# Patient Record
Sex: FEMALE | Race: OTHER | URBAN - METROPOLITAN AREA
[De-identification: names, ages, dates, MRNs, and addresses within clinical notes are randomized per-mention and may not be internally consistent; named-entity substitution may affect disease eponyms.]

---

## 2018-05-24 ENCOUNTER — EMERGENCY (EMERGENCY)
Facility: HOSPITAL | Age: 38
LOS: 0 days | Discharge: HOME | End: 2018-05-24
Attending: EMERGENCY MEDICINE | Admitting: EMERGENCY MEDICINE

## 2018-05-24 VITALS
HEART RATE: 88 BPM | RESPIRATION RATE: 19 BRPM | WEIGHT: 203.05 LBS | DIASTOLIC BLOOD PRESSURE: 72 MMHG | HEIGHT: 64 IN | OXYGEN SATURATION: 98 % | SYSTOLIC BLOOD PRESSURE: 153 MMHG | TEMPERATURE: 97 F

## 2018-05-24 DIAGNOSIS — M54.9 DORSALGIA, UNSPECIFIED: ICD-10-CM

## 2018-05-24 DIAGNOSIS — G89.29 OTHER CHRONIC PAIN: ICD-10-CM

## 2018-05-24 DIAGNOSIS — M54.42 LUMBAGO WITH SCIATICA, LEFT SIDE: ICD-10-CM

## 2018-05-24 RX ORDER — KETOROLAC TROMETHAMINE 30 MG/ML
60 SYRINGE (ML) INJECTION ONCE
Qty: 0 | Refills: 0 | Status: DISCONTINUED | OUTPATIENT
Start: 2018-05-24 | End: 2018-05-24

## 2018-05-24 RX ORDER — METHOCARBAMOL 500 MG/1
750 TABLET, FILM COATED ORAL ONCE
Qty: 0 | Refills: 0 | Status: COMPLETED | OUTPATIENT
Start: 2018-05-24 | End: 2018-05-24

## 2018-05-24 RX ADMIN — METHOCARBAMOL 750 MILLIGRAM(S): 500 TABLET, FILM COATED ORAL at 23:09

## 2018-05-24 RX ADMIN — Medication 30 MILLIGRAM(S): at 23:12

## 2018-05-24 NOTE — ED PROVIDER NOTE - ATTENDING CONTRIBUTION TO CARE
36 yo F, chronic sciatica pain, here for assessment of L sided back pain radiating to LE. Pain is typical of previous, just didn't resolve with motrin which she took this morning. No neurological findings, drop foot, urinary or fecal incontinence or retention. Has unremarkable exam aside from mid TTP at pelvic notch and positive straight leg raise.     Sx suggestive of sciatica without new neuro findings.     Motrin, toradol, steroids. Advised on need for MRI, neuro/ortho follow up for continued care.

## 2018-05-24 NOTE — ED PROVIDER NOTE - OBJECTIVE STATEMENT
36 yo F ho sciatic back pain presents with L sided back pain radiating down bottom to LE. States feels exactly like baseline sciatica but pain not better with motrin from this am. Recently started seeing PT and acupuncture, unsure if that exacerbated pain. Denies inability to walk, losss of sensation to LE, numbness, IV drug use, loss of bowel and bladder fx, cp, sob, nausea, vomiting.

## 2018-05-24 NOTE — ED PROVIDER NOTE - PHYSICAL EXAMINATION
CONSTITUTIONAL: Well-developed; well-nourished; in no acute distress.   SKIN: warm, dry  NECK: Supple; non tender.  CARD: S1, S2 normal; no murmurs, gallops, or rubs. Regular rate and rhythm.   RESP: No wheezes, rales or rhonchi.  ABD: soft ntnd  EXT: Normal ROM.  No saddle anesthesia. no foot drop, 5/5 motor.   NEURO: Alert, oriented, grossly unremarkable  PSYCH: Cooperative, appropriate. CONSTITUTIONAL: Well-developed; well-nourished; in no acute distress.   SKIN: warm, dry  NECK: Supple; non tender.  CARD: S1, S2 normal; no murmurs, gallops, or rubs. Regular rate and rhythm.   RESP: No wheezes, rales or rhonchi.  ABD: soft ntnd  BACK: No midline ttp, + Ll back ttp. +straight leg test that reproduces exact pain.   EXT: Normal ROM.  No saddle anesthesia. no foot drop, 5/5 motor.   NEURO: Alert, oriented, grossly unremarkable  PSYCH: Cooperative, appropriate.

## 2018-05-25 ENCOUNTER — EMERGENCY (EMERGENCY)
Facility: HOSPITAL | Age: 38
LOS: 1 days | Discharge: HOME | End: 2018-05-25
Attending: EMERGENCY MEDICINE | Admitting: EMERGENCY MEDICINE

## 2018-05-25 VITALS
RESPIRATION RATE: 18 BRPM | OXYGEN SATURATION: 100 % | TEMPERATURE: 98 F | DIASTOLIC BLOOD PRESSURE: 100 MMHG | SYSTOLIC BLOOD PRESSURE: 170 MMHG | HEART RATE: 106 BPM

## 2018-05-25 DIAGNOSIS — M54.5 LOW BACK PAIN: ICD-10-CM

## 2018-05-25 DIAGNOSIS — M54.9 DORSALGIA, UNSPECIFIED: ICD-10-CM

## 2018-05-25 DIAGNOSIS — G89.29 OTHER CHRONIC PAIN: ICD-10-CM

## 2018-05-26 VITALS
DIASTOLIC BLOOD PRESSURE: 88 MMHG | OXYGEN SATURATION: 98 % | RESPIRATION RATE: 18 BRPM | SYSTOLIC BLOOD PRESSURE: 142 MMHG | HEART RATE: 85 BPM

## 2018-05-26 RX ORDER — KETOROLAC TROMETHAMINE 30 MG/ML
60 SYRINGE (ML) INJECTION ONCE
Qty: 0 | Refills: 0 | Status: DISCONTINUED | OUTPATIENT
Start: 2018-05-26 | End: 2018-05-26

## 2018-05-26 RX ORDER — METHOCARBAMOL 500 MG/1
1000 TABLET, FILM COATED ORAL ONCE
Qty: 0 | Refills: 0 | Status: COMPLETED | OUTPATIENT
Start: 2018-05-26 | End: 2018-05-26

## 2018-05-26 RX ORDER — KETOROLAC TROMETHAMINE 30 MG/ML
30 SYRINGE (ML) INJECTION ONCE
Qty: 0 | Refills: 0 | Status: DISCONTINUED | OUTPATIENT
Start: 2018-05-26 | End: 2018-05-26

## 2018-05-26 RX ORDER — MORPHINE SULFATE 50 MG/1
4 CAPSULE, EXTENDED RELEASE ORAL ONCE
Qty: 0 | Refills: 0 | Status: DISCONTINUED | OUTPATIENT
Start: 2018-05-26 | End: 2018-05-26

## 2018-05-26 RX ORDER — DEXAMETHASONE 0.5 MG/5ML
10 ELIXIR ORAL ONCE
Qty: 0 | Refills: 0 | Status: COMPLETED | OUTPATIENT
Start: 2018-05-26 | End: 2018-05-26

## 2018-05-26 RX ORDER — METHOCARBAMOL 500 MG/1
500 TABLET, FILM COATED ORAL ONCE
Qty: 0 | Refills: 0 | Status: DISCONTINUED | OUTPATIENT
Start: 2018-05-26 | End: 2018-05-26

## 2018-05-26 RX ORDER — METHOCARBAMOL 500 MG/1
2 TABLET, FILM COATED ORAL
Qty: 18 | Refills: 0 | OUTPATIENT
Start: 2018-05-26 | End: 2018-05-28

## 2018-05-26 RX ADMIN — Medication 10 MILLIGRAM(S): at 03:50

## 2018-05-26 RX ADMIN — METHOCARBAMOL 1000 MILLIGRAM(S): 500 TABLET, FILM COATED ORAL at 03:46

## 2018-05-26 RX ADMIN — MORPHINE SULFATE 4 MILLIGRAM(S): 50 CAPSULE, EXTENDED RELEASE ORAL at 04:24

## 2018-05-26 RX ADMIN — Medication 60 MILLIGRAM(S): at 03:47

## 2018-05-26 NOTE — ED PROVIDER NOTE - ATTENDING CONTRIBUTION TO CARE
36 yo f with pmh of herniated discs with sciatica, presents with left lower back pain/buttock pain with radiation down her left leg.  pt denies saddle anesthesia, urinary retention/incontinence, midline back pain, trauma, leg weakness.  pt was in ed yesterday, given meds, but no rx.  pt says pain has continued. 36 yo f with pmh of herniated discs with sciatica, presents with left lower back pain/buttock pain with radiation down her left leg.  pt denies saddle anesthesia, urinary retention/incontinence, midline back pain, trauma, leg weakness.  pt was in ed yesterday, given meds, but no rx.  pt says pain has continued. exam: no midline back ttp, tense and mildly ttp left gluteus, sensation intact b/l le, walking in ed imp: pt with h/o sciatica, c/o left lower back pain.  no red  flags, ambulatory, will rpt meds, add decadron, advised to f/u with her neuro/pain mgt

## 2018-05-26 NOTE — ED PROVIDER NOTE - PHYSICAL EXAMINATION
VITAL SIGNS: I have reviewed the initial vital signs.   CONSTITUTIONAL: Awake, alert. Well-developed; well-nourished; in no distress. Non-toxic appearing.   SKIN: No rash, vesicles/lesion, abrasions or lacerations. No ecchymosis or signs of trauma. Cap refill < 2 secs.   HEAD: Normocephalic; atraumatic. No step offs or tenderness. No Wright Sign’s or Raccoon eyes.   EYES: Symmetrical, no discharge or signs of trauma. Conjunctiva and sclera clear. PERRL, EOM intact with no nystagmus or pain.   ENT: Airway patent. MMM.   NECK: Supple; non-tender.   CARD: No chest wall deformity or tenderness. S1, S2 normal; no murmurs, gallops, or rubs. Regular rate and rhythm.  RESP: Good air movement. Lungs CTAB. No crackles, wheezes, rales or rhonchi.  ABD: Soft; non-distended; non-tender.    EXT: No bony deformity or tenderness. Normal ROM x 4 extremities. No midline spinal ttp. + b/l paravertebral tenderness with + muscle spasm.   NEURO: A&Ox3. GCS 15. Normal speech. CN 2-12 intact. Strength 5/5 UE/LE b/l. No sensory deficits. n/v intact UE/LE b/l, pulses symmetrical. Normal gait.  PSYCH: Cooperative, appropriate.

## 2018-05-26 NOTE — ED PROVIDER NOTE - NS ED ROS FT
Except as documented in HPI, all other ROS negative.   GENERAL: Denies fever/chills, loss of appetite/weight or fatigue.  SKIN: Denies rashes, abrasions, lacerations, ecchymosis, erythema, or edema.  HEAD: Denies headache, dizziness or trauma.  ENT: Denies earaches, discharge or hearing loss. Denies nasal discharge or epistaxis. Denies sore throat.   CARDIAC: Denies chest pain, palpitations, or SOB.   RESPIRATORY: Denies SOB, cough, hemoptysis or wheezing.   GI: Denies abdominal pain, n/v/d.   : Denies hematuria, dysuria or frequency.   MSK: + back pain.   NEURO: Denies paresthesias, tingling or weakness.

## 2018-05-26 NOTE — ED PROVIDER NOTE - OBJECTIVE STATEMENT
Pt is a 36 y/o Female, PMHX of herniated discs, Sciatica, presents to ED for back pain. Pt reports she has been taking Motrin with no relief. Was seen in ED last night, given Toradol and Robaxin, reports minimal relief. States pain is similar to her chronic back pain in the past, is worse with ambulation. Denies fever/chills, trauma/injury, urinary/fecal incontinence, prior back sxs or injections, abdominal pain, n/v/d, urinary symptoms, numbness, tingling, weakness.

## 2019-10-11 ENCOUNTER — TRANSCRIPTION ENCOUNTER (OUTPATIENT)
Age: 39
End: 2019-10-11

## 2024-01-04 ENCOUNTER — EMERGENCY (EMERGENCY)
Facility: HOSPITAL | Age: 44
LOS: 0 days | Discharge: HOME | End: 2024-01-04
Attending: EMERGENCY MEDICINE
Payer: MEDICAID

## 2024-01-04 VITALS
RESPIRATION RATE: 18 BRPM | SYSTOLIC BLOOD PRESSURE: 144 MMHG | HEART RATE: 82 BPM | TEMPERATURE: 98 F | WEIGHT: 184.97 LBS | OXYGEN SATURATION: 99 % | DIASTOLIC BLOOD PRESSURE: 86 MMHG

## 2024-01-04 VITALS — HEART RATE: 78 BPM | DIASTOLIC BLOOD PRESSURE: 74 MMHG | SYSTOLIC BLOOD PRESSURE: 143 MMHG | TEMPERATURE: 98 F

## 2024-01-04 DIAGNOSIS — R00.2 PALPITATIONS: ICD-10-CM

## 2024-01-04 DIAGNOSIS — R07.9 CHEST PAIN, UNSPECIFIED: ICD-10-CM

## 2024-01-04 DIAGNOSIS — I10 ESSENTIAL (PRIMARY) HYPERTENSION: ICD-10-CM

## 2024-01-04 PROBLEM — M54.30 SCIATICA, UNSPECIFIED SIDE: Chronic | Status: ACTIVE | Noted: 2018-05-24

## 2024-01-04 PROBLEM — M54.9 DORSALGIA, UNSPECIFIED: Chronic | Status: ACTIVE | Noted: 2018-05-24

## 2024-01-04 PROCEDURE — 99284 EMERGENCY DEPT VISIT MOD MDM: CPT | Mod: 25

## 2024-01-04 PROCEDURE — 93005 ELECTROCARDIOGRAM TRACING: CPT

## 2024-01-04 PROCEDURE — 71045 X-RAY EXAM CHEST 1 VIEW: CPT

## 2024-01-04 PROCEDURE — 93308 TTE F-UP OR LMTD: CPT

## 2024-01-04 PROCEDURE — 93010 ELECTROCARDIOGRAM REPORT: CPT

## 2024-01-04 PROCEDURE — 99053 MED SERV 10PM-8AM 24 HR FAC: CPT

## 2024-01-04 PROCEDURE — 93308 TTE F-UP OR LMTD: CPT | Mod: 26

## 2024-01-04 PROCEDURE — 71045 X-RAY EXAM CHEST 1 VIEW: CPT | Mod: 26

## 2024-01-04 PROCEDURE — 99285 EMERGENCY DEPT VISIT HI MDM: CPT

## 2024-01-04 NOTE — ED PROVIDER NOTE - CLINICAL SUMMARY MEDICAL DECISION MAKING FREE TEXT BOX
Patient with palpitations no chest pain.  Here EKG no ischemia no arrhythmia chest x-ray within normal limits and bedside echo within normal limits.  Patient advised outpatient follow-up workup of palpitations.  Interpretation by Dr. Denys Brown: normal  EKG: NSR, nml axis, normal intervals, no ST Elevations independent interpretation of the Xray film(s) performed by: Dr. Denys Brown: CARLOS

## 2024-01-04 NOTE — ED PROVIDER NOTE - PATIENT PORTAL LINK FT
You can access the FollowMyHealth Patient Portal offered by [facility] by registering at the following website: [email or web address]. By joining [employer]’s FollowMyHealth portal, you will also be able to view your health information using other applications (apps) compatible with our system. You can access the FollowMyHealth Patient Portal offered by  by registering at the following website: [email or web address]. By joining [employer]’s FollowMyHealth portal, you will also be able to view your health information using other applications (apps) compatible with our system.

## 2024-01-04 NOTE — ED PROVIDER NOTE - OBJECTIVE STATEMENT
Patient is a 43-year-old female with past medical history of hypertension (not currently taking her medications) presenting for palpitations.  Patient endorses 1 week of persistent, non-exertional chest palpitations and wanted to be evaluated.  Patient denies any prior history of heart disease, family history of cardiac sudden death, not on oral contraceptives, no history of DVT or pulmonary embolisms, denies recent travels, no history of cancer or hemoptysis, denies shortness of breath, recent travels, nausea, vomiting, fevers, congestion, abdominal pain, or additional complaints.

## 2024-01-04 NOTE — ED PROVIDER NOTE - NSFOLLOWUPINSTRUCTIONS_ED_ALL_ED_FT
Our Emergency Department Referral Coordinators will be reaching out to you in the next 24-48 hours from 9:00am to 5:00pm with a follow up appointment. Please expect a phone call from the hospital in that time frame. If you do not receive a call or if you have any questions or concerns, you can reach them at   [phone]     Chest Pain    Chest pain can be caused by many different conditions which may or may not be dangerous. Causes include heartburn, lung infections, heart attack, blood clot in lungs, skin infections, strain or damage to muscle, cartilage, or bones, etc. Lab tests or other studies including an electrocardiogram (EKG) may have been performed to find the cause of your pain. Make sure to follow up with a cardiologist or as instructed by your health care professional.    SEEK IMMEDIATE MEDICAL CARE IF YOU HAVE THE FOLLOWING SYMPTOMS: worsening chest pain, coughing up blood, unexplained back/neck/jaw pain, severe abdominal pain, dizziness or lightheadedness, shortness of breath, sweaty or clammy skin, vomiting, or racing heart beat. These symptoms may represent a serious problem that is an emergency. Do not wait to see if the symptoms will go away. Get medical help right away. Call your local emergency services (911 in the [place] Do not drive yourself to the hospital. Our Emergency Department Referral Coordinators will be reaching out to you in the next 24-48 hours from 9:00am to 5:00pm with a follow up appointment. Please expect a phone call from the hospital in that time frame. If you do not receive a call or if you have any questions or concerns, you can reach them at   [phone]     Chest Pain    Chest pain can be caused by many different conditions which may or may not be dangerous. Causes include heartburn, lung infections, heart attack, blood clot in lungs, skin infections, strain or damage to muscle, cartilage, or bones, etc. Lab tests or other studies including an electrocardiogram (EKG) may have been performed to find the cause of your pain. Make sure to follow up with a cardiologist or as instructed by your health care professional.    SEEK IMMEDIATE MEDICAL CARE IF YOU HAVE THE FOLLOWING SYMPTOMS: worsening chest pain, coughing up blood, unexplained back/neck/jaw pain, severe abdominal pain, dizziness or lightheadedness, shortness of breath, sweaty or clammy skin, vomiting, or racing heart beat. These symptoms may represent a serious problem that is an emergency. Do not wait to see if the symptoms will go away. Get medical help right away. Call your local emergency services (911 in the [place] Do not drive yourself to the hospital.

## 2024-01-04 NOTE — ED ADULT NURSE NOTE - NSFALLUNIVINTERV_ED_ALL_ED
Bed/Stretcher in lowest position, wheels locked, appropriate side rails in place/Call bell, personal items and telephone in reach/Instruct patient to call for assistance before getting out of bed/chair/stretcher/Non-slip footwear applied when patient is off stretcher/[place] to call system/Physically safe environment - no spills, clutter or unnecessary equipment/Purposeful proactive rounding/Room/bathroom lighting operational, light cord in reach Bed/Stretcher in lowest position, wheels locked, appropriate side rails in place/Call bell, personal items and telephone in reach/Instruct patient to call for assistance before getting out of bed/chair/stretcher/Non-slip footwear applied when patient is off stretcher/[place] to call system/Physically safe environment - no spills, clutter or unnecessary equipment/Purposeful proactive rounding/Room/bathroom lighting operational, light cord in reach

## 2024-01-04 NOTE — ED ADULT NURSE NOTE - OBJECTIVE STATEMENT
OVERNIGHT EVENTS: None    SUBJECTIVE:  Patient seen and examined at bedside.  ROS: Patient denies h/n/v/d, fever, chills, cp, palpitations, sob, abd pain, leg swelling, rashes, dysuria, and changes in BM.     Vital Signs Last 12 Hrs  T(F): 98.8 (12-02-22 @ 05:59), Max: 98.8 (12-02-22 @ 05:59)  HR: 83 (12-02-22 @ 05:59) (83 - 83)  BP: 98/63 (12-02-22 @ 05:59) (98/63 - 98/63)  BP(mean): --  RR: 18 (12-02-22 @ 05:59) (18 - 18)  SpO2: 92% (12-02-22 @ 05:59) (92% - 92%)  I&O's Summary    01 Dec 2022 07:01  -  02 Dec 2022 07:00  --------------------------------------------------------  IN: 50 mL / OUT: 190 mL / NET: -140 mL        PHYSICAL EXAM:  Constitutional: resting comfortably in bed on 3L NC; NAD  HEENT: NC/AT, PERRL, EOMI, anicteric sclera, no nasal discharge; MMM  Neck: supple; no JVD  Respiratory: unlabored breathing, decreased breath sounds to left lung; no wheezing, rhonchi, or crackles, no intercostal retractions, no accessory muscle use, no nasal flaring  Cardiac: +S1/S2; RRR; no M/R/G  Gastrointestinal: soft, NT/ND; no rebound or guarding; +BS  Extremities: no clubbing or cyanosis; no peripheral edema  Vascular: 2+ radial pulses B/L  Dermatologic: skin warm, dry and intact  Neurologic: AAOx3; no focal deficits      LABS:               11.7   14.44 )-----------( 742      ( 02 Dec 2022 08:04 )             37.6     12-02    133<L>  |  97  |  5<L>  ----------------------------<  111<H>  4.8   |  27  |  0.65    Ca    8.5      02 Dec 2022 08:04  Phos  2.4     12-02  Mg     2.1     12-02    TPro  6.6  /  Alb  2.8<L>  /  TBili  0.2  /  DBili  x   /  AST  26  /  ALT  47<H>  /  AlkPhos  109  12-02    PT/INR - ( 01 Dec 2022 08:04 )   PT: 15.7 sec;   INR: 1.32          PTT - ( 01 Dec 2022 08:04 )  PTT:28.7 sec        RADIOLOGY & ADDITIONAL TESTS:    MEDICATIONS  (STANDING):  alteplase  Injectable for Pleural Effusion 10 milliGRAM(s) IntraPleural. every 12 hours  budesonide 160 MICROgram(s)/formoterol 4.5 MICROgram(s) Inhaler 2 Puff(s) Inhalation two times a day  dornase dedra Solution for Pleural Effusion 5 milliGRAM(s) IntraPleural. every 12 hours  enoxaparin Injectable 40 milliGRAM(s) SubCutaneous every 24 hours  piperacillin/tazobactam IVPB.. 3.375 Gram(s) IV Intermittent every 8 hours  sodium chloride 0.9% Solution for Pleural Effusion 30 milliLiter(s) IntraPleural. every 12 hours    MEDICATIONS  (PRN):  acetaminophen     Tablet .. 650 milliGRAM(s) Oral every 6 hours PRN Temp greater or equal to 38C (100.4F), Mild Pain (1 - 3)  guaiFENesin Oral Liquid (Sugar-Free) 100 milliGRAM(s) Oral every 8 hours PRN Cough  melatonin 3 milliGRAM(s) Oral at bedtime PRN Insomnia   Pt complains of "irregular heart beat" for past couple of days.

## 2024-02-08 ENCOUNTER — NON-APPOINTMENT (OUTPATIENT)
Age: 44
End: 2024-02-08

## 2024-05-30 PROBLEM — Z00.00 ENCOUNTER FOR PREVENTIVE HEALTH EXAMINATION: Status: ACTIVE | Noted: 2024-05-30

## 2024-06-08 ENCOUNTER — OUTPATIENT (OUTPATIENT)
Dept: OUTPATIENT SERVICES | Facility: HOSPITAL | Age: 44
LOS: 1 days | End: 2024-06-08
Payer: MEDICAID

## 2024-06-08 DIAGNOSIS — Z12.31 ENCOUNTER FOR SCREENING MAMMOGRAM FOR MALIGNANT NEOPLASM OF BREAST: ICD-10-CM

## 2024-06-08 DIAGNOSIS — Z00.00 ENCOUNTER FOR GENERAL ADULT MEDICAL EXAMINATION WITHOUT ABNORMAL FINDINGS: ICD-10-CM

## 2024-06-08 PROCEDURE — 77067 SCR MAMMO BI INCL CAD: CPT

## 2024-06-08 PROCEDURE — 77063 BREAST TOMOSYNTHESIS BI: CPT | Mod: 26

## 2024-06-08 PROCEDURE — 77067 SCR MAMMO BI INCL CAD: CPT | Mod: 26

## 2024-06-08 PROCEDURE — 77063 BREAST TOMOSYNTHESIS BI: CPT

## 2024-06-09 DIAGNOSIS — Z12.31 ENCOUNTER FOR SCREENING MAMMOGRAM FOR MALIGNANT NEOPLASM OF BREAST: ICD-10-CM

## 2024-06-14 ENCOUNTER — APPOINTMENT (OUTPATIENT)
Dept: PAIN MANAGEMENT | Facility: CLINIC | Age: 44
End: 2024-06-14
Payer: MEDICAID

## 2024-06-14 DIAGNOSIS — M54.50 LOW BACK PAIN, UNSPECIFIED: ICD-10-CM

## 2024-06-14 DIAGNOSIS — M54.16 RADICULOPATHY, LUMBAR REGION: ICD-10-CM

## 2024-06-14 DIAGNOSIS — Z86.79 PERSONAL HISTORY OF OTHER DISEASES OF THE CIRCULATORY SYSTEM: ICD-10-CM

## 2024-06-14 PROCEDURE — 99204 OFFICE O/P NEW MOD 45 MIN: CPT

## 2024-06-14 RX ORDER — METHYLPREDNISOLONE 4 MG/1
4 TABLET ORAL
Qty: 1 | Refills: 0 | Status: ACTIVE | COMMUNITY
Start: 2024-06-14 | End: 1900-01-01

## 2024-06-14 RX ORDER — AMLODIPINE BESYLATE 5 MG/1
5 TABLET ORAL
Refills: 0 | Status: ACTIVE | COMMUNITY

## 2024-06-14 NOTE — DISCUSSION/SUMMARY
[de-identified] : A discussion regarding available pain management treatment options occurred with the patient. These included interventional, rehabilitative, pharmacological, and alternative modalities. We will proceed with the following:   Rehabilitative options: -Participation in active HEP was discussed and printed. LUMBAR DISC: Patient given specific exercises to do including side plank, Quadruped arm/leg raise, Extension exercise, and Glut Bridges.   Medication based treatment options: - ordered a Medrol Dose Pack 4mg, use as directed for a duration of 6 days.   Complementary treatment options: - Patient was advised to stay away from any heavy lifting. If needed, she was advised to squat and not bend forward. - Initiate physician directed activity and lifestyle modifications.  Follow up in 4-6 weeks for reassessment.  I, Leatha Ohara, attest that this documentation has been prepared under the direction and in the presence of Provider Joe Lane, DO The documentation recorded by the scribe, in my presence, accurately reflects the service I personally performed, and the decisions made by me with my edits as appropriate.   Best Regards, Joe Lane D.O.

## 2024-06-14 NOTE — HISTORY OF PRESENT ILLNESS
[FreeTextEntry1] : HISTORY OF PRESENT ILLNESS: Mrs. Costello is a 43-year-old female complaining of lower back pain. The pain started after no specific injury or event. The patient has had this pain for years.  Patient describes the pain as moderate to severe.  During the last month the pain has been nearly constant with symptoms worsening in no typical pattern. Pain described as sharp, burning, tingling that does radiates down the left leg that is 8/10 pain intensity. Sitting and bending worsens the pain, standing improves the pain. NSAIDs and HEP has been tried without relief. Patient denies bowel/bladder incontinence, weakness, falls.   ACTIVITIES: Patient uses no assisted walking device at this time.  Patient has difficulty performing household chores, going to work, doing yardwork or shopping, participating in recreational activities & exercise at this time.   PRIOR PAIN TREATMENTS:  Moderate relief with nerve block injection, physical therapy, exercise, TENS, heat treatment, cold treatment.  Prior Pain Medications: Tylenol, Aleve.

## 2024-06-21 ENCOUNTER — OUTPATIENT (OUTPATIENT)
Dept: OUTPATIENT SERVICES | Facility: HOSPITAL | Age: 44
LOS: 1 days | End: 2024-06-21
Payer: MEDICAID

## 2024-06-21 DIAGNOSIS — R92.8 OTHER ABNORMAL AND INCONCLUSIVE FINDINGS ON DIAGNOSTIC IMAGING OF BREAST: ICD-10-CM

## 2024-06-21 PROCEDURE — 77061 BREAST TOMOSYNTHESIS UNI: CPT | Mod: 26

## 2024-06-21 PROCEDURE — G0279: CPT

## 2024-06-21 PROCEDURE — 77065 DX MAMMO INCL CAD UNI: CPT | Mod: RT

## 2024-06-21 PROCEDURE — 76641 ULTRASOUND BREAST COMPLETE: CPT | Mod: 50

## 2024-06-21 PROCEDURE — 77065 DX MAMMO INCL CAD UNI: CPT | Mod: 26,RT

## 2024-06-21 PROCEDURE — 76641 ULTRASOUND BREAST COMPLETE: CPT | Mod: 26,50

## 2024-06-22 DIAGNOSIS — R92.8 OTHER ABNORMAL AND INCONCLUSIVE FINDINGS ON DIAGNOSTIC IMAGING OF BREAST: ICD-10-CM

## 2024-07-26 ENCOUNTER — APPOINTMENT (OUTPATIENT)
Dept: PAIN MANAGEMENT | Facility: CLINIC | Age: 44
End: 2024-07-26
Payer: MEDICAID

## 2024-07-26 DIAGNOSIS — M54.16 RADICULOPATHY, LUMBAR REGION: ICD-10-CM

## 2024-07-26 DIAGNOSIS — M54.2 CERVICALGIA: ICD-10-CM

## 2024-07-26 PROCEDURE — 99213 OFFICE O/P EST LOW 20 MIN: CPT

## 2024-07-26 NOTE — DISCUSSION/SUMMARY
[de-identified] : A discussion regarding available pain management treatment options occurred with the patient. These included interventional, rehabilitative, pharmacological, and alternative modalities. We will proceed with the following:   Imaginf:  1. MRI lumbar spine w/o contrast was ordered to evaluate for anatomic changes of the lumbar discs, nerves, and surrounding tissue that will help provide information to accurately diagnose the patient's cause of pain and therefore treat said pain generator in the most effective way possible - whether that be specific physical therapy recommendations, medications, and/or interventional therapies.  Rehabilitative options: -Participation in active HEP was discussed and printed. *I gave the patient a list of home exercises to do for pain reduction and overall improvement in functional status including but not limited to active neck rotation, active neck side bend, neck flexion, neck extension, chin tuck, scalene stretch, isometric neck flexion, isometric neck extension, isometric neck side bend, head lift/neck curl, head lift/neck side bend, neck extension on hands and knees, and scapula squeeze. LUMBAR DISC: Patient given specific exercises to do including side plank, Quadruped arm/leg raise, Extension exercise, and Glut Bridges.    Complementary treatment options: - Patient was advised to stay away from any heavy lifting. If needed, she was advised to squat and not bend forward. - Initiate physician directed activity and lifestyle modifications.  Follow up after imaging studies for further recommendations.  I, Leatha Ohara, attest that this documentation has been prepared under the direction and in the presence of Provider Joe Lane, DO The documentation recorded by the scribe, in my presence, accurately reflects the service I personally performed, and the decisions made by me with my edits as appropriate.   Best Regards, Joe Lane D.O.

## 2024-07-26 NOTE — PHYSICAL EXAM
[de-identified] : BACK- Seated slump (-) BL.   NECK- No pain with flexion, extension and lateral rotation of the neck.  Inspection:    erythema (-)   ecchymosis (-)                                              ROM:    Full ROM

## 2024-07-26 NOTE — HISTORY OF PRESENT ILLNESS
[FreeTextEntry1] : HISTORY OF PRESENT ILLNESS: Mrs. Costello is a 43-year-old female complaining of lower back pain. The pain started after no specific injury or event. The patient has had this pain for years.  Patient describes the pain as moderate to severe.  During the last month the pain has been nearly constant with symptoms worsening in no typical pattern. Pain described as sharp, burning, tingling that does radiates down the left leg that is 8/10 pain intensity. Sitting and bending worsens the pain, standing improves the pain. NSAIDs and HEP has been tried without relief. Patient denies bowel/bladder incontinence, weakness, falls.   PRESENTING TODAY 07-: Patient presents to the office today for a follow up visit with continued low back pain and new complaints of neck pain. She continues with low back pain that is sharp, burning, tingling that does radiates down the left leg associated with numbness that is 8/10 pain intensity. She notes she has been completing a home exercise regimen focusing on exercises including side plank, Quadruped arm/leg raise, Extension exercise, and Glut Bridges for at least 30 mins a day 4x a week which has been providing her with minimal to no relief. She notes she cannot tolerate NSAIDs and does not like taking oral steroids.   In regard to her neck pain, that is dull, achy, throbbing that does not radiate and is 8/10 pain intensity. She admits to migraines associated with her neck pain. NSAIDs, PT, chiro has been tried with/without relief. Patient denies bowel/bladder incontinence, weakness, falls, tingling, numbness.

## 2024-08-31 ENCOUNTER — OUTPATIENT (OUTPATIENT)
Dept: OUTPATIENT SERVICES | Facility: HOSPITAL | Age: 44
LOS: 1 days | End: 2024-08-31
Payer: MEDICAID

## 2024-08-31 ENCOUNTER — RESULT REVIEW (OUTPATIENT)
Age: 44
End: 2024-08-31

## 2024-08-31 DIAGNOSIS — M54.16 RADICULOPATHY, LUMBAR REGION: ICD-10-CM

## 2024-08-31 DIAGNOSIS — Z00.8 ENCOUNTER FOR OTHER GENERAL EXAMINATION: ICD-10-CM

## 2024-08-31 PROCEDURE — 72148 MRI LUMBAR SPINE W/O DYE: CPT | Mod: 26

## 2024-08-31 PROCEDURE — 72148 MRI LUMBAR SPINE W/O DYE: CPT

## 2024-09-01 DIAGNOSIS — M54.16 RADICULOPATHY, LUMBAR REGION: ICD-10-CM

## 2024-09-06 ENCOUNTER — APPOINTMENT (OUTPATIENT)
Dept: PAIN MANAGEMENT | Facility: CLINIC | Age: 44
End: 2024-09-06
Payer: MEDICAID

## 2024-09-06 ENCOUNTER — APPOINTMENT (OUTPATIENT)
Dept: UROGYNECOLOGY | Facility: CLINIC | Age: 44
End: 2024-09-06
Payer: MEDICAID

## 2024-09-06 VITALS
DIASTOLIC BLOOD PRESSURE: 85 MMHG | HEART RATE: 88 BPM | BODY MASS INDEX: 32.44 KG/M2 | WEIGHT: 190 LBS | HEIGHT: 64 IN | SYSTOLIC BLOOD PRESSURE: 125 MMHG

## 2024-09-06 DIAGNOSIS — Z80.9 FAMILY HISTORY OF MALIGNANT NEOPLASM, UNSPECIFIED: ICD-10-CM

## 2024-09-06 DIAGNOSIS — Z82.3 FAMILY HISTORY OF STROKE: ICD-10-CM

## 2024-09-06 DIAGNOSIS — N81.6 RECTOCELE: ICD-10-CM

## 2024-09-06 DIAGNOSIS — M54.50 LOW BACK PAIN, UNSPECIFIED: ICD-10-CM

## 2024-09-06 DIAGNOSIS — M54.16 RADICULOPATHY, LUMBAR REGION: ICD-10-CM

## 2024-09-06 DIAGNOSIS — Z83.3 FAMILY HISTORY OF DIABETES MELLITUS: ICD-10-CM

## 2024-09-06 DIAGNOSIS — Z78.9 OTHER SPECIFIED HEALTH STATUS: ICD-10-CM

## 2024-09-06 DIAGNOSIS — Z82.49 FAMILY HISTORY OF ISCHEMIC HEART DISEASE AND OTHER DISEASES OF THE CIRCULATORY SYSTEM: ICD-10-CM

## 2024-09-06 PROCEDURE — 51701 INSERT BLADDER CATHETER: CPT

## 2024-09-06 PROCEDURE — 99205 OFFICE O/P NEW HI 60 MIN: CPT | Mod: 25

## 2024-09-06 PROCEDURE — 99459 PELVIC EXAMINATION: CPT

## 2024-09-06 PROCEDURE — 99214 OFFICE O/P EST MOD 30 MIN: CPT

## 2024-09-06 NOTE — DISCUSSION/SUMMARY
[FreeTextEntry1] :  Rectocele- The patient was counseled regarding the possible natural progression of prolapse and the clinical consequences of worsening prolapse. The stage and the location of the prolapse was reviewed with the patient. She was counseled regarding the management strategies including observation, pelvic floor physical therapy, pessary placement and surgery (posterior colporrhaphy/perineorrhaphy). The patient voiced understanding and agrees with a referral to pelvic PT and a pessary fitting.

## 2024-09-06 NOTE — DISCUSSION/SUMMARY
[de-identified] : A discussion regarding available pain management treatment options occurred with the patient. These included interventional, rehabilitative, pharmacological, and alternative modalities. We will proceed with the following:  Interventional treatment options:  1. Patient will proceed with a left L4-5, L5-S1 TFESI MAC. Treatment options were discussed with the patient. The patient has been having persistent lower back and lumbar radicular pain with minimal improvement with conservative therapies. The patient was given the option to proceed with a lumbar transforaminal epidural steroid injection to try to get some pain relief.        The risks and benefits were discussed which included bleeding, infection, nerve injury, no pain relief or worse, increased pain. All questions were answered and concerns addressed.  Risk, benefits, pros and cons of procedure were explained to the patient using models and diagrams and their questions were answered.  The patient has severe anxiety of procedures that necessitates monitored anesthesia care (MAC). The procedure performed will be close to major nerves, arteries, and spinal cord and/or joint structures. Due to the proximity of these structures, we need the patient to be still during the procedure.  With the help of MAC, this will be safely achieved and decrease the risk of any complications.  Rehabilitative options: -Participation in active HEP was discussed and printed. *I gave the patient a list of home exercises to do for pain reduction and overall improvement in functional status including but not limited to active neck rotation, active neck side bend, neck flexion, neck extension, chin tuck, scalene stretch, isometric neck flexion, isometric neck extension, isometric neck side bend, head lift/neck curl, head lift/neck side bend, neck extension on hands and knees, and scapula squeeze. LUMBAR DISC: Patient given specific exercises to do including side plank, Quadruped arm/leg raise, Extension exercise, and Glut Bridges.    Complementary treatment options: - Patient was advised to stay away from any heavy lifting. If needed, she was advised to squat and not bend forward. - Physician directed activity and lifestyle modifications.  Follow up 1-2 weeks post injection for assessment of efficacy and further recommendations.  I, Leatha Ohara, attest that this documentation has been prepared under the direction and in the presence of Provider Joe Lane, DO The documentation recorded by the scribe, in my presence, accurately reflects the service I personally performed, and the decisions made by me with my edits as appropriate.   Best Regards, Joe Lane D.O.

## 2024-09-06 NOTE — HISTORY OF PRESENT ILLNESS
[FreeTextEntry1] : Pt with pelvic floor dysfunction here for urogynecologic evaluation. She describes:   Chief PFD: bulge  Pelvic organ prolapse: +bulge, for the last 2 years, denies splinting, done with childbearing Stress urinary incontinence: denies Overactive bladder syndrome: denies Voiding dysfunction: no Incomplete bladder emptying, no hesitancy  Lower urinary tract/vaginal symptoms: no recurrent UTIs per year, no hematuria, no dysuria, no bladder pain  Fecal incontinence: denies Defecatory dysfunction: sausage Sexual dysfunction: not active Pelvic pain: denies Vaginal dryness denies  Her pelvic floor symptoms are significantly bothersome and negatively impacting her quality of life.

## 2024-09-06 NOTE — END OF VISIT
[TextEntry] : 60m E&M, >50% spent in direct face to face counseling/coordination of care rectocele. This excludes cath.  All questions answered. Patient reassured.

## 2024-09-06 NOTE — HISTORY OF PRESENT ILLNESS
[FreeTextEntry1] : HISTORY OF PRESENT ILLNESS: Mrs. Costello is a 43-year-old female complaining of lower back pain. The pain started after no specific injury or event. The patient has had this pain for years.  Patient describes the pain as moderate to severe.  During the last month the pain has been nearly constant with symptoms worsening in no typical pattern. Pain described as sharp, burning, tingling that does radiates down the left leg that is 8/10 pain intensity. Sitting and bending worsens the pain, standing improves the pain. NSAIDs and HEP has been tried without relief. Patient denies bowel/bladder incontinence, weakness, falls.   PRESENTING TODAY 09-: Patient presents to the office today for a follow up visit with continued low back pain and new complaints of neck pain. We reviewed the MRI of her lumbar spine in detail during today's visit which has been scanned into her chart. She continues with low back pain that is sharp, burning, tingling that does radiates down the left leg associated with numbness that is 8/10 pain intensity. She notes she has been completing a home exercise regimen focusing on exercises including side plank, Quadruped arm/leg raise, Extension exercise, and Glut Bridges for at least 30 mins a day 4x a week which has been providing her with minimal to no relief. She notes she cannot tolerate NSAIDs and does not like taking oral steroids.   She also continues with neck pain, that is dull, achy, throbbing that does not radiate and is 8/10 pain intensity. She admits to migraines associated with her neck pain. NSAIDs, PT, chiro has been tried with/without relief. Patient denies bowel/bladder incontinence, weakness, falls, tingling, numbness.

## 2024-09-06 NOTE — PHYSICAL EXAM
[de-identified] : BACK- Seated slump + left    NECK- No pain with flexion, extension and lateral rotation of the neck.  Inspection:    erythema (-)   ecchymosis (-)                                              ROM:    Full ROM

## 2024-09-06 NOTE — PHYSICAL EXAM
[Chaperone Present] : A chaperone was present in the examining room during all aspects of the physical examination [78350] : A chaperone was present during the pelvic exam. [FreeTextEntry2] : SUGEY [FreeTextEntry1] : Void:  50cc PVR:  10cc Urethra was prepped in sterile fashion and then a sterile non- indwelling catheter (14F) was used by me to drain the bladder. The patient tolerated the procedure well. Indication: Rectocele   Ap:0  Bp: 0   No abdominal incisions normal perineal sensation normal perineal reflexes -cough stress test -atrophy +urethral hypermobility +bilateral levator ani spasm, + tenderness -urethral tenderness -bladder tenderness -cervical tenderness 1/5 Kegel

## 2024-09-06 NOTE — COUNSELING
[FreeTextEntry1] :  We will notify you of the urine results if they are abnormal.  Please schedule a pessary fitting with my PA, Claudia  Referral to pelvic floor physical therapy  [facility] [address] [place], [place] [id number]   [facility] [address] [place] [address] Phone: [phone]  At home pelvic floor physical therapy: [email or web address]

## 2024-09-06 NOTE — REASON FOR VISIT
[TextEntry] :  Reason for visit: New Patient    Voids per day: 2-3  Voids per night: 0  Urge incontinence: No  Stress incontinence: Occasional +  Constipation: No  Fecal incontinence: No  Vaginal bulge: Yes

## 2024-09-08 LAB — URINE CULTURE <10: NORMAL

## 2024-10-25 ENCOUNTER — APPOINTMENT (OUTPATIENT)
Dept: UROGYNECOLOGY | Facility: CLINIC | Age: 44
End: 2024-10-25
Payer: MEDICAID

## 2024-10-25 VITALS
HEART RATE: 91 BPM | SYSTOLIC BLOOD PRESSURE: 133 MMHG | DIASTOLIC BLOOD PRESSURE: 88 MMHG | HEIGHT: 64 IN | BODY MASS INDEX: 32.44 KG/M2 | WEIGHT: 190 LBS

## 2024-10-25 DIAGNOSIS — N76.0 ACUTE VAGINITIS: ICD-10-CM

## 2024-10-25 DIAGNOSIS — N81.6 RECTOCELE: ICD-10-CM

## 2024-10-25 DIAGNOSIS — R39.9 UNSPECIFIED SYMPTOMS AND SIGNS INVOLVING THE GENITOURINARY SYSTEM: ICD-10-CM

## 2024-10-25 PROCEDURE — 51701 INSERT BLADDER CATHETER: CPT

## 2024-10-25 PROCEDURE — 99214 OFFICE O/P EST MOD 30 MIN: CPT | Mod: 25

## 2024-10-25 PROCEDURE — 99459 PELVIC EXAMINATION: CPT

## 2024-10-25 RX ORDER — FLUCONAZOLE 150 MG/1
150 TABLET ORAL
Qty: 2 | Refills: 0 | Status: ACTIVE | COMMUNITY
Start: 2024-10-25 | End: 1900-01-01

## 2024-10-25 RX ORDER — NITROFURANTOIN (MONOHYDRATE/MACROCRYSTALS) 25; 75 MG/1; MG/1
100 CAPSULE ORAL TWICE DAILY
Qty: 14 | Refills: 0 | Status: COMPLETED | COMMUNITY
Start: 2024-10-25 | End: 2024-11-01

## 2024-10-29 LAB — URINE CULTURE <10: NORMAL

## 2025-05-02 ENCOUNTER — APPOINTMENT (OUTPATIENT)
Dept: UROGYNECOLOGY | Facility: CLINIC | Age: 45
End: 2025-05-02

## 2025-06-06 ENCOUNTER — APPOINTMENT (OUTPATIENT)
Dept: UROGYNECOLOGY | Facility: CLINIC | Age: 45
End: 2025-06-06

## 2025-06-25 ENCOUNTER — APPOINTMENT (OUTPATIENT)
Dept: UROGYNECOLOGY | Facility: CLINIC | Age: 45
End: 2025-06-25
Payer: COMMERCIAL

## 2025-06-25 VITALS
HEART RATE: 82 BPM | BODY MASS INDEX: 31.41 KG/M2 | HEIGHT: 64 IN | WEIGHT: 184 LBS | DIASTOLIC BLOOD PRESSURE: 80 MMHG | SYSTOLIC BLOOD PRESSURE: 113 MMHG

## 2025-06-25 PROCEDURE — 57160 INSERT PESSARY/OTHER DEVICE: CPT

## 2025-06-25 PROCEDURE — 99459 PELVIC EXAMINATION: CPT

## 2025-06-25 PROCEDURE — A4562: CPT

## 2025-06-25 PROCEDURE — 99214 OFFICE O/P EST MOD 30 MIN: CPT | Mod: 25

## 2025-07-08 ENCOUNTER — APPOINTMENT (OUTPATIENT)
Dept: UROGYNECOLOGY | Facility: CLINIC | Age: 45
End: 2025-07-08

## 2025-07-18 ENCOUNTER — APPOINTMENT (OUTPATIENT)
Dept: UROGYNECOLOGY | Facility: CLINIC | Age: 45
End: 2025-07-18
Payer: COMMERCIAL

## 2025-07-18 VITALS
HEIGHT: 64 IN | DIASTOLIC BLOOD PRESSURE: 84 MMHG | HEART RATE: 90 BPM | SYSTOLIC BLOOD PRESSURE: 120 MMHG | WEIGHT: 184 LBS | BODY MASS INDEX: 31.41 KG/M2

## 2025-07-18 PROCEDURE — 99214 OFFICE O/P EST MOD 30 MIN: CPT

## 2025-07-18 PROCEDURE — 99459 PELVIC EXAMINATION: CPT
